# Patient Record
Sex: MALE | Race: WHITE | NOT HISPANIC OR LATINO | Employment: FULL TIME | URBAN - METROPOLITAN AREA
[De-identification: names, ages, dates, MRNs, and addresses within clinical notes are randomized per-mention and may not be internally consistent; named-entity substitution may affect disease eponyms.]

---

## 2023-03-31 PROBLEM — F98.8 ADD (ATTENTION DEFICIT DISORDER): Status: ACTIVE | Noted: 2023-03-31

## 2023-03-31 PROBLEM — A60.00 GENITAL HERPES: Status: ACTIVE | Noted: 2023-03-31

## 2023-03-31 RX ORDER — DEXTROAMPHETAMINE SACCHARATE, AMPHETAMINE ASPARTATE, DEXTROAMPHETAMINE SULFATE AND AMPHETAMINE SULFATE 3.75; 3.75; 3.75; 3.75 MG/1; MG/1; MG/1; MG/1
15 TABLET ORAL DAILY
COMMUNITY
End: 2023-04-18 | Stop reason: DRUGHIGH

## 2023-03-31 RX ORDER — VALACYCLOVIR HYDROCHLORIDE 1 G/1
1000 TABLET, FILM COATED ORAL 2 TIMES DAILY
COMMUNITY

## 2023-04-13 LAB
ALANINE AMINOTRANSFERASE (SGPT) (U/L) IN SER/PLAS: 45 U/L (ref 10–52)
ALBUMIN (G/DL) IN SER/PLAS: 4.6 G/DL (ref 3.4–5)
ALKALINE PHOSPHATASE (U/L) IN SER/PLAS: 48 U/L (ref 33–120)
ANION GAP IN SER/PLAS: 13 MMOL/L (ref 10–20)
ASPARTATE AMINOTRANSFERASE (SGOT) (U/L) IN SER/PLAS: 21 U/L (ref 9–39)
BILIRUBIN TOTAL (MG/DL) IN SER/PLAS: 0.7 MG/DL (ref 0–1.2)
CALCIUM (MG/DL) IN SER/PLAS: 9.4 MG/DL (ref 8.6–10.3)
CARBON DIOXIDE, TOTAL (MMOL/L) IN SER/PLAS: 30 MMOL/L (ref 21–32)
CHLORIDE (MMOL/L) IN SER/PLAS: 101 MMOL/L (ref 98–107)
CHOLESTEROL (MG/DL) IN SER/PLAS: 139 MG/DL (ref 0–199)
CHOLESTEROL IN HDL (MG/DL) IN SER/PLAS: 33 MG/DL
CHOLESTEROL/HDL RATIO: 4.2
CREATININE (MG/DL) IN SER/PLAS: 0.95 MG/DL (ref 0.5–1.3)
ERYTHROCYTE DISTRIBUTION WIDTH (RATIO) BY AUTOMATED COUNT: 11.9 % (ref 11.5–14.5)
ERYTHROCYTE MEAN CORPUSCULAR HEMOGLOBIN CONCENTRATION (G/DL) BY AUTOMATED: 33.7 G/DL (ref 32–36)
ERYTHROCYTE MEAN CORPUSCULAR VOLUME (FL) BY AUTOMATED COUNT: 91 FL (ref 80–100)
ERYTHROCYTES (10*6/UL) IN BLOOD BY AUTOMATED COUNT: 5 X10E12/L (ref 4.5–5.9)
GFR MALE: >90 ML/MIN/1.73M2
GLUCOSE (MG/DL) IN SER/PLAS: 91 MG/DL (ref 74–99)
HEMATOCRIT (%) IN BLOOD BY AUTOMATED COUNT: 45.7 % (ref 41–52)
HEMOGLOBIN (G/DL) IN BLOOD: 15.4 G/DL (ref 13.5–17.5)
LDL: 88 MG/DL (ref 0–99)
LEUKOCYTES (10*3/UL) IN BLOOD BY AUTOMATED COUNT: 4.8 X10E9/L (ref 4.4–11.3)
PLATELETS (10*3/UL) IN BLOOD AUTOMATED COUNT: 230 X10E9/L (ref 150–450)
POTASSIUM (MMOL/L) IN SER/PLAS: 3.7 MMOL/L (ref 3.5–5.3)
PROTEIN TOTAL: 6.9 G/DL (ref 6.4–8.2)
SODIUM (MMOL/L) IN SER/PLAS: 140 MMOL/L (ref 136–145)
THYROTROPIN (MIU/L) IN SER/PLAS BY DETECTION LIMIT <= 0.05 MIU/L: 1.55 MIU/L (ref 0.44–3.98)
TRIGLYCERIDE (MG/DL) IN SER/PLAS: 90 MG/DL (ref 0–149)
UREA NITROGEN (MG/DL) IN SER/PLAS: 14 MG/DL (ref 6–23)
VLDL: 18 MG/DL (ref 0–40)

## 2023-04-18 ENCOUNTER — OFFICE VISIT (OUTPATIENT)
Dept: PRIMARY CARE | Facility: CLINIC | Age: 33
End: 2023-04-18
Payer: COMMERCIAL

## 2023-04-18 VITALS
RESPIRATION RATE: 18 BRPM | HEIGHT: 67 IN | DIASTOLIC BLOOD PRESSURE: 76 MMHG | TEMPERATURE: 98 F | OXYGEN SATURATION: 97 % | WEIGHT: 198.6 LBS | SYSTOLIC BLOOD PRESSURE: 128 MMHG | BODY MASS INDEX: 31.17 KG/M2 | HEART RATE: 75 BPM

## 2023-04-18 DIAGNOSIS — F98.8 ATTENTION DEFICIT DISORDER (ADD) WITHOUT HYPERACTIVITY: Primary | ICD-10-CM

## 2023-04-18 DIAGNOSIS — F41.0 PANIC DISORDER: ICD-10-CM

## 2023-04-18 PROCEDURE — 99214 OFFICE O/P EST MOD 30 MIN: CPT | Performed by: FAMILY MEDICINE

## 2023-04-18 RX ORDER — DEXTROAMPHETAMINE SACCHARATE, AMPHETAMINE ASPARTATE, DEXTROAMPHETAMINE SULFATE AND AMPHETAMINE SULFATE 5; 5; 5; 5 MG/1; MG/1; MG/1; MG/1
20 TABLET ORAL DAILY
Qty: 30 TABLET | Refills: 0 | Status: SHIPPED | OUTPATIENT
Start: 2023-06-17 | End: 2023-07-25 | Stop reason: SDUPTHER

## 2023-04-18 RX ORDER — PROPRANOLOL HYDROCHLORIDE 10 MG/1
TABLET ORAL
Qty: 30 TABLET | Refills: 1 | Status: SHIPPED | OUTPATIENT
Start: 2023-04-18

## 2023-04-18 RX ORDER — DEXTROAMPHETAMINE SACCHARATE, AMPHETAMINE ASPARTATE, DEXTROAMPHETAMINE SULFATE AND AMPHETAMINE SULFATE 5; 5; 5; 5 MG/1; MG/1; MG/1; MG/1
20 TABLET ORAL DAILY
Qty: 30 TABLET | Refills: 0 | Status: SHIPPED | OUTPATIENT
Start: 2023-05-18 | End: 2023-07-25 | Stop reason: SDUPTHER

## 2023-04-18 RX ORDER — DEXTROAMPHETAMINE SACCHARATE, AMPHETAMINE ASPARTATE, DEXTROAMPHETAMINE SULFATE AND AMPHETAMINE SULFATE 5; 5; 5; 5 MG/1; MG/1; MG/1; MG/1
20 TABLET ORAL DAILY
Qty: 30 TABLET | Refills: 0 | Status: SHIPPED | OUTPATIENT
Start: 2023-04-18 | End: 2023-07-24 | Stop reason: SDUPTHER

## 2023-04-18 NOTE — PROGRESS NOTES
"Balta Dodson is a 33 y.o. male who presents for Follow-up (3 month medication recheck).    Adderall is working reasonably well.  Feels like it doesn't work as well as it did at first.    Doesn't sleep well, but that is unchanged.     Appetite is good.    Gets anxiety feelings when not on it.    Started golf, riding a peliton.  Meditating.             Review of Systems   Constitutional:  Negative for fever.   Respiratory:  Negative for shortness of breath.    Cardiovascular:  Negative for chest pain.   Gastrointestinal:  Negative for nausea and vomiting.   Neurological:  Negative for dizziness and light-headedness.   All other systems reviewed and are negative.      Objective   /76   Pulse 75   Temp 36.7 °C (98 °F)   Resp 18   Ht 1.702 m (5' 7\")   Wt 90.1 kg (198 lb 9.6 oz)   SpO2 97%   BMI 31.11 kg/m²     Physical Exam  Vitals reviewed.   Constitutional:       Appearance: Normal appearance.   HENT:      Head: Normocephalic and atraumatic.      Nose: Nose normal.   Eyes:      Extraocular Movements: Extraocular movements intact.      Pupils: Pupils are equal, round, and reactive to light.   Cardiovascular:      Rate and Rhythm: Normal rate and regular rhythm.   Pulmonary:      Effort: Pulmonary effort is normal.      Breath sounds: Normal breath sounds.   Musculoskeletal:         General: Normal range of motion.      Cervical back: Normal range of motion.   Skin:     General: Skin is warm and dry.   Neurological:      Mental Status: He is alert.   Psychiatric:         Mood and Affect: Mood normal.         Assessment/Plan   Problem List Items Addressed This Visit          Other    ADD (attention deficit disorder) - Primary     Adderall 15 mg helped quite a bit but will increase his dose to 20 mg to see if we can get a better effect.  .  He does not like to use the XR.  Had trouble with it in the past.   We discussed sleep hygiene, stress mgmt, etc.           Relevant Medications    " amphetamine-dextroamphetamine (Adderall) 20 mg tablet    amphetamine-dextroamphetamine (Adderall) 20 mg tablet (Start on 5/18/2023)    amphetamine-dextroamphetamine (Adderall) 20 mg tablet (Start on 6/17/2023)    Panic disorder     Stress mgmt.  Sxs are not due to the adderall as he seems to only have sxs when he is not on it.  Will do a trial of propranolol for prn use.   Behavioral modification.          Relevant Medications    propranolol (Inderal) 10 mg tablet         There are no Patient Instructions on file for this visit.

## 2023-04-20 ASSESSMENT — ENCOUNTER SYMPTOMS
VOMITING: 0
SHORTNESS OF BREATH: 0
LIGHT-HEADEDNESS: 0
DIZZINESS: 0
NAUSEA: 0
FEVER: 0

## 2023-04-20 NOTE — ASSESSMENT & PLAN NOTE
Adderall 15 mg helped quite a bit but will increase his dose to 20 mg to see if we can get a better effect.  .  He does not like to use the XR.  Had trouble with it in the past.   We discussed sleep hygiene, stress mgmt, etc.

## 2023-04-20 NOTE — ASSESSMENT & PLAN NOTE
Stress mgmt.  Sxs are not due to the adderall as he seems to only have sxs when he is not on it.  Will do a trial of propranolol for prn use.   Behavioral modification.

## 2023-07-24 ENCOUNTER — OFFICE VISIT (OUTPATIENT)
Dept: PRIMARY CARE | Facility: CLINIC | Age: 33
End: 2023-07-24
Payer: COMMERCIAL

## 2023-07-24 VITALS
HEART RATE: 74 BPM | HEIGHT: 67 IN | WEIGHT: 197 LBS | RESPIRATION RATE: 16 BRPM | DIASTOLIC BLOOD PRESSURE: 86 MMHG | BODY MASS INDEX: 30.92 KG/M2 | TEMPERATURE: 98.1 F | SYSTOLIC BLOOD PRESSURE: 124 MMHG

## 2023-07-24 DIAGNOSIS — F98.8 ATTENTION DEFICIT DISORDER (ADD) WITHOUT HYPERACTIVITY: ICD-10-CM

## 2023-07-24 PROCEDURE — 99213 OFFICE O/P EST LOW 20 MIN: CPT | Performed by: FAMILY MEDICINE

## 2023-07-24 RX ORDER — DEXTROAMPHETAMINE SACCHARATE, AMPHETAMINE ASPARTATE, DEXTROAMPHETAMINE SULFATE AND AMPHETAMINE SULFATE 5; 5; 5; 5 MG/1; MG/1; MG/1; MG/1
20 TABLET ORAL DAILY
Qty: 30 TABLET | Refills: 0 | Status: SHIPPED | OUTPATIENT
Start: 2023-07-24 | End: 2023-10-26 | Stop reason: SDUPTHER

## 2023-07-24 NOTE — PROGRESS NOTES
"Balta Dodson is a 33 y.o. male who presents for ADHD.    Weight loss since January is due to quitting pop and fast food.    Working out on his pelViS.     Feeling good on the adderall.  Sometimes doesn't take it on the weekend.      ADHD  This is a chronic problem. The current episode started more than 1 year ago. The problem has been resolved. Pertinent negatives include no chest pain, fever, nausea or vomiting.        Review of Systems   Constitutional:  Negative for fever.   Respiratory:  Negative for shortness of breath.    Cardiovascular:  Negative for chest pain.   Gastrointestinal:  Negative for nausea and vomiting.   Neurological:  Negative for dizziness and light-headedness.   All other systems reviewed and are negative.      Objective   /86   Pulse 74   Temp 36.7 °C (98.1 °F)   Resp 16   Ht 1.702 m (5' 7\")   Wt 89.4 kg (197 lb)   BMI 30.85 kg/m²     Physical Exam  Vitals reviewed.   Constitutional:       Appearance: Normal appearance.   HENT:      Head: Normocephalic and atraumatic.      Nose: Nose normal.   Eyes:      Extraocular Movements: Extraocular movements intact.      Pupils: Pupils are equal, round, and reactive to light.   Cardiovascular:      Rate and Rhythm: Normal rate and regular rhythm.   Pulmonary:      Effort: Pulmonary effort is normal.      Breath sounds: Normal breath sounds.   Musculoskeletal:         General: Normal range of motion.      Cervical back: Normal range of motion.   Skin:     General: Skin is warm and dry.   Neurological:      Mental Status: He is alert.   Psychiatric:         Mood and Affect: Mood normal.         Assessment/Plan   Problem List Items Addressed This Visit       ADD (attention deficit disorder)    Relevant Medications    amphetamine-dextroamphetamine (Adderall) 20 mg tablet    amphetamine-dextroamphetamine (Adderall) 20 mg tablet (Start on 8/23/2023)    amphetamine-dextroamphetamine (Adderall) 20 mg tablet (Start on 9/23/2023) "         There are no Patient Instructions on file for this visit.   Yes

## 2023-07-25 RX ORDER — DEXTROAMPHETAMINE SACCHARATE, AMPHETAMINE ASPARTATE, DEXTROAMPHETAMINE SULFATE AND AMPHETAMINE SULFATE 5; 5; 5; 5 MG/1; MG/1; MG/1; MG/1
20 TABLET ORAL DAILY
Qty: 30 TABLET | Refills: 0 | Status: SHIPPED | OUTPATIENT
Start: 2023-08-23 | End: 2023-10-26 | Stop reason: SDUPTHER

## 2023-07-25 RX ORDER — DEXTROAMPHETAMINE SACCHARATE, AMPHETAMINE ASPARTATE, DEXTROAMPHETAMINE SULFATE AND AMPHETAMINE SULFATE 5; 5; 5; 5 MG/1; MG/1; MG/1; MG/1
20 TABLET ORAL DAILY
Qty: 30 TABLET | Refills: 0 | Status: SHIPPED | OUTPATIENT
Start: 2023-09-23 | End: 2023-12-01 | Stop reason: DRUGHIGH

## 2023-07-25 ASSESSMENT — ENCOUNTER SYMPTOMS
SHORTNESS OF BREATH: 0
NAUSEA: 0
DIZZINESS: 0
FEVER: 0
LIGHT-HEADEDNESS: 0
VOMITING: 0

## 2023-10-25 ENCOUNTER — TELEPHONE (OUTPATIENT)
Dept: PRIMARY CARE | Facility: CLINIC | Age: 33
End: 2023-10-25
Payer: COMMERCIAL

## 2023-10-25 NOTE — TELEPHONE ENCOUNTER
Pt's follow up appointment was rescheduled by us twice and is now set for 12/01/2023. Pt needs refill of Adderall 20mg to hold him over. Pt has enough med for 10/25/23 but will be out after that. Local pharm correct.

## 2023-10-26 DIAGNOSIS — F98.8 ATTENTION DEFICIT DISORDER (ADD) WITHOUT HYPERACTIVITY: ICD-10-CM

## 2023-10-26 RX ORDER — DEXTROAMPHETAMINE SACCHARATE, AMPHETAMINE ASPARTATE, DEXTROAMPHETAMINE SULFATE AND AMPHETAMINE SULFATE 5; 5; 5; 5 MG/1; MG/1; MG/1; MG/1
20 TABLET ORAL DAILY
Qty: 30 TABLET | Refills: 0 | Status: SHIPPED | OUTPATIENT
Start: 2023-10-26 | End: 2023-12-01 | Stop reason: DRUGHIGH

## 2023-10-26 RX ORDER — DEXTROAMPHETAMINE SACCHARATE, AMPHETAMINE ASPARTATE, DEXTROAMPHETAMINE SULFATE AND AMPHETAMINE SULFATE 5; 5; 5; 5 MG/1; MG/1; MG/1; MG/1
20 TABLET ORAL DAILY
Qty: 30 TABLET | Refills: 0 | Status: SHIPPED | OUTPATIENT
Start: 2023-11-25 | End: 2023-12-01 | Stop reason: DRUGHIGH

## 2023-11-24 ENCOUNTER — APPOINTMENT (OUTPATIENT)
Dept: PRIMARY CARE | Facility: CLINIC | Age: 33
End: 2023-11-24
Payer: COMMERCIAL

## 2023-12-01 ENCOUNTER — OFFICE VISIT (OUTPATIENT)
Dept: PRIMARY CARE | Facility: CLINIC | Age: 33
End: 2023-12-01
Payer: COMMERCIAL

## 2023-12-01 VITALS
SYSTOLIC BLOOD PRESSURE: 128 MMHG | TEMPERATURE: 98 F | HEART RATE: 84 BPM | WEIGHT: 202.13 LBS | DIASTOLIC BLOOD PRESSURE: 84 MMHG | BODY MASS INDEX: 31.72 KG/M2 | HEIGHT: 67 IN

## 2023-12-01 DIAGNOSIS — F41.9 ANXIETY: ICD-10-CM

## 2023-12-01 DIAGNOSIS — R52 PAIN: ICD-10-CM

## 2023-12-01 DIAGNOSIS — F98.8 ATTENTION DEFICIT DISORDER (ADD) WITHOUT HYPERACTIVITY: ICD-10-CM

## 2023-12-01 DIAGNOSIS — F90.2 ATTENTION DEFICIT HYPERACTIVITY DISORDER (ADHD), COMBINED TYPE: ICD-10-CM

## 2023-12-01 LAB
AMPHETAMINES UR QL SCN: ABNORMAL
BARBITURATES UR QL SCN: ABNORMAL
BENZODIAZ UR QL SCN: ABNORMAL
BZE UR QL SCN: ABNORMAL
CANNABINOIDS UR QL SCN: ABNORMAL
FENTANYL+NORFENTANYL UR QL SCN: ABNORMAL
OPIATES UR QL SCN: ABNORMAL
OXYCODONE+OXYMORPHONE UR QL SCN: ABNORMAL
PCP UR QL SCN: ABNORMAL

## 2023-12-01 PROCEDURE — 80324 DRUG SCREEN AMPHETAMINES 1/2: CPT

## 2023-12-01 PROCEDURE — 80307 DRUG TEST PRSMV CHEM ANLYZR: CPT

## 2023-12-01 PROCEDURE — 99213 OFFICE O/P EST LOW 20 MIN: CPT | Performed by: FAMILY MEDICINE

## 2023-12-01 RX ORDER — DEXTROAMPHETAMINE SACCHARATE, AMPHETAMINE ASPARTATE, DEXTROAMPHETAMINE SULFATE AND AMPHETAMINE SULFATE 7.5; 7.5; 7.5; 7.5 MG/1; MG/1; MG/1; MG/1
30 TABLET ORAL DAILY
Qty: 30 TABLET | Refills: 0 | Status: SHIPPED | OUTPATIENT
Start: 2023-12-31 | End: 2024-02-06 | Stop reason: SDUPTHER

## 2023-12-01 RX ORDER — DEXTROAMPHETAMINE SACCHARATE, AMPHETAMINE ASPARTATE, DEXTROAMPHETAMINE SULFATE AND AMPHETAMINE SULFATE 7.5; 7.5; 7.5; 7.5 MG/1; MG/1; MG/1; MG/1
30 TABLET ORAL DAILY
Qty: 30 TABLET | Refills: 0 | Status: SHIPPED | OUTPATIENT
Start: 2023-12-01 | End: 2024-03-01 | Stop reason: SDUPTHER

## 2023-12-01 RX ORDER — DEXTROAMPHETAMINE SACCHARATE, AMPHETAMINE ASPARTATE, DEXTROAMPHETAMINE SULFATE AND AMPHETAMINE SULFATE 7.5; 7.5; 7.5; 7.5 MG/1; MG/1; MG/1; MG/1
30 TABLET ORAL DAILY
Qty: 30 TABLET | Refills: 0 | Status: SHIPPED | OUTPATIENT
Start: 2024-01-30 | End: 2024-03-01 | Stop reason: SDUPTHER

## 2023-12-01 NOTE — PROGRESS NOTES
"Balta Dodson is a 33 y.o. male who presents for ADD (Fu/UDS and CSA needed).    Doing ok but still having trouble concentrating.  Uses the med early in the day.  Wears off by 2 pm.  Has been using 25 mg daily for an additional affect due to appointment being cancelled by us.  Would like to have a higher dose.           Review of Systems   Constitutional:  Negative for fever.   Respiratory:  Negative for shortness of breath.    Cardiovascular:  Negative for chest pain.   Gastrointestinal:  Negative for nausea and vomiting.   Neurological:  Negative for dizziness and light-headedness.   All other systems reviewed and are negative.      Objective   /89   Pulse 84   Temp 36.7 °C (98 °F)   Ht 1.702 m (5' 7\")   Wt 91.7 kg (202 lb 2 oz)   BMI 31.66 kg/m²     Physical Exam  Vitals reviewed.   Constitutional:       Appearance: Normal appearance.   HENT:      Head: Normocephalic and atraumatic.      Nose: Nose normal.   Eyes:      Extraocular Movements: Extraocular movements intact.      Pupils: Pupils are equal, round, and reactive to light.   Cardiovascular:      Rate and Rhythm: Normal rate and regular rhythm.   Pulmonary:      Effort: Pulmonary effort is normal.      Breath sounds: Normal breath sounds.   Musculoskeletal:         General: Normal range of motion.      Cervical back: Normal range of motion.   Skin:     General: Skin is warm and dry.   Neurological:      Mental Status: He is alert.   Psychiatric:         Mood and Affect: Mood normal.         Assessment/Plan   Problem List Items Addressed This Visit       ADD (attention deficit disorder)    Relevant Medications    amphetamine-dextroamphetamine (Adderall) 30 mg tablet    amphetamine-dextroamphetamine (Adderall) 30 mg tablet (Start on 12/31/2023)    amphetamine-dextroamphetamine (Adderall) 30 mg tablet (Start on 1/30/2024)    Other Relevant Orders    Follow Up In [facility] - PCP - Established    Drug Screen, Urine With " Reflex to Confirmation     Other Visit Diagnoses       Pain        Relevant Orders    Drug Screen, Urine With Reflex to Confirmation    Anxiety        Relevant Orders    Drug Screen, Urine With Reflex to Confirmation              There are no Patient Instructions on file for this visit.

## 2023-12-04 ASSESSMENT — ENCOUNTER SYMPTOMS
FEVER: 0
DIZZINESS: 0
VOMITING: 0
SHORTNESS OF BREATH: 0
NAUSEA: 0
LIGHT-HEADEDNESS: 0

## 2023-12-05 LAB
AMPHET UR-MCNC: 1172 NG/ML
MDA UR-MCNC: <200 NG/ML
MDEA UR-MCNC: <200 NG/ML
MDMA UR-MCNC: <200 NG/ML
METHAMPHET UR-MCNC: <200 NG/ML
PHENTERMINE UR CFM-MCNC: <200 NG/ML

## 2024-02-06 ENCOUNTER — TELEPHONE (OUTPATIENT)
Dept: PRIMARY CARE | Facility: CLINIC | Age: 34
End: 2024-02-06
Payer: COMMERCIAL

## 2024-02-06 DIAGNOSIS — F98.8 ATTENTION DEFICIT DISORDER (ADD) WITHOUT HYPERACTIVITY: ICD-10-CM

## 2024-02-06 RX ORDER — DEXTROAMPHETAMINE SACCHARATE, AMPHETAMINE ASPARTATE, DEXTROAMPHETAMINE SULFATE AND AMPHETAMINE SULFATE 7.5; 7.5; 7.5; 7.5 MG/1; MG/1; MG/1; MG/1
30 TABLET ORAL DAILY
Qty: 30 TABLET | Refills: 0 | Status: SHIPPED | OUTPATIENT
Start: 2024-02-06 | End: 2024-03-01 | Stop reason: SDUPTHER

## 2024-02-06 NOTE — TELEPHONE ENCOUNTER
Pt called and said that the [facility] he was using closed and he needs his adderall called into a new pharmacy.     [facility] [id number]

## 2024-03-01 ENCOUNTER — OFFICE VISIT (OUTPATIENT)
Dept: PRIMARY CARE | Facility: CLINIC | Age: 34
End: 2024-03-01
Payer: COMMERCIAL

## 2024-03-01 VITALS
BODY MASS INDEX: 31.23 KG/M2 | SYSTOLIC BLOOD PRESSURE: 126 MMHG | TEMPERATURE: 98.1 F | HEIGHT: 67 IN | WEIGHT: 199 LBS | DIASTOLIC BLOOD PRESSURE: 87 MMHG | HEART RATE: 70 BPM

## 2024-03-01 DIAGNOSIS — F98.8 ATTENTION DEFICIT DISORDER (ADD) WITHOUT HYPERACTIVITY: ICD-10-CM

## 2024-03-01 PROCEDURE — 99213 OFFICE O/P EST LOW 20 MIN: CPT | Performed by: FAMILY MEDICINE

## 2024-03-01 RX ORDER — DEXTROAMPHETAMINE SACCHARATE, AMPHETAMINE ASPARTATE, DEXTROAMPHETAMINE SULFATE AND AMPHETAMINE SULFATE 7.5; 7.5; 7.5; 7.5 MG/1; MG/1; MG/1; MG/1
30 TABLET ORAL DAILY
Qty: 30 TABLET | Refills: 0 | Status: SHIPPED | OUTPATIENT
Start: 2024-04-30 | End: 2024-06-04 | Stop reason: SDUPTHER

## 2024-03-01 RX ORDER — DEXTROAMPHETAMINE SACCHARATE, AMPHETAMINE ASPARTATE, DEXTROAMPHETAMINE SULFATE AND AMPHETAMINE SULFATE 7.5; 7.5; 7.5; 7.5 MG/1; MG/1; MG/1; MG/1
30 TABLET ORAL DAILY
Qty: 30 TABLET | Refills: 0 | Status: SHIPPED | OUTPATIENT
Start: 2024-03-01 | End: 2024-06-04 | Stop reason: SDUPTHER

## 2024-03-01 RX ORDER — DEXTROAMPHETAMINE SACCHARATE, AMPHETAMINE ASPARTATE, DEXTROAMPHETAMINE SULFATE AND AMPHETAMINE SULFATE 7.5; 7.5; 7.5; 7.5 MG/1; MG/1; MG/1; MG/1
30 TABLET ORAL DAILY
Qty: 30 TABLET | Refills: 0 | Status: SHIPPED | OUTPATIENT
Start: 2024-03-31 | End: 2024-06-04 | Stop reason: SDUPTHER

## 2024-03-01 NOTE — PROGRESS NOTES
"Balta Dodson is a 34 y.o. male who presents for ADD (Routine med follow up).    Having a baby in August.    Just bought a house in [place].    Doing well on the current adderall.  Doesn't sleep well but never has.      ADD  Pertinent negatives include no chest pain, fever, nausea or vomiting.        Review of Systems   Constitutional:  Negative for fever.   Respiratory:  Negative for shortness of breath.    Cardiovascular:  Negative for chest pain.   Gastrointestinal:  Negative for nausea and vomiting.   Neurological:  Negative for dizziness and light-headedness.   All other systems reviewed and are negative.      Objective   /87   Pulse 70   Temp 36.7 °C (98.1 °F)   Ht 1.702 m (5' 7\")   Wt 90.3 kg (199 lb)   BMI 31.17 kg/m²     Physical Exam  Vitals reviewed.   Constitutional:       Appearance: Normal appearance.   HENT:      Head: Normocephalic and atraumatic.      Nose: Nose normal.   Eyes:      Extraocular Movements: Extraocular movements intact.      Pupils: Pupils are equal, round, and reactive to light.   Cardiovascular:      Rate and Rhythm: Normal rate and regular rhythm.   Pulmonary:      Effort: Pulmonary effort is normal.      Breath sounds: Normal breath sounds.   Musculoskeletal:         General: Normal range of motion.      Cervical back: Normal range of motion.   Skin:     General: Skin is warm and dry.   Neurological:      Mental Status: He is alert.   Psychiatric:         Mood and Affect: Mood normal.         Assessment/Plan   Problem List Items Addressed This Visit       ADD (attention deficit disorder)     Doing well on current medication.  Blood pressure stable.  Continue same         Relevant Medications    amphetamine-dextroamphetamine (Adderall) 30 mg tablet (Start on 3/31/2024)    amphetamine-dextroamphetamine (Adderall) 30 mg tablet (Start on 4/30/2024)    amphetamine-dextroamphetamine (Adderall) 30 mg tablet    Other Relevant Orders    3 Months - Follow Up " In [facility] - PCP         There are no Patient Instructions on file for this visit.

## 2024-03-04 ASSESSMENT — ENCOUNTER SYMPTOMS
SHORTNESS OF BREATH: 0
LIGHT-HEADEDNESS: 0
DIZZINESS: 0
NAUSEA: 0
FEVER: 0
VOMITING: 0

## 2024-04-01 ENCOUNTER — APPOINTMENT (OUTPATIENT)
Dept: ENDOCRINOLOGY | Facility: CLINIC | Age: 34
End: 2024-04-01
Payer: COMMERCIAL

## 2024-06-04 ENCOUNTER — OFFICE VISIT (OUTPATIENT)
Dept: PRIMARY CARE | Facility: CLINIC | Age: 34
End: 2024-06-04
Payer: COMMERCIAL

## 2024-06-04 VITALS
HEIGHT: 67 IN | WEIGHT: 199 LBS | DIASTOLIC BLOOD PRESSURE: 80 MMHG | RESPIRATION RATE: 16 BRPM | BODY MASS INDEX: 31.23 KG/M2 | TEMPERATURE: 97.7 F | HEART RATE: 80 BPM | SYSTOLIC BLOOD PRESSURE: 122 MMHG

## 2024-06-04 DIAGNOSIS — F98.8 ATTENTION DEFICIT DISORDER (ADD) WITHOUT HYPERACTIVITY: ICD-10-CM

## 2024-06-04 PROCEDURE — 99213 OFFICE O/P EST LOW 20 MIN: CPT | Performed by: FAMILY MEDICINE

## 2024-06-04 RX ORDER — DEXTROAMPHETAMINE SACCHARATE, AMPHETAMINE ASPARTATE, DEXTROAMPHETAMINE SULFATE AND AMPHETAMINE SULFATE 7.5; 7.5; 7.5; 7.5 MG/1; MG/1; MG/1; MG/1
30 TABLET ORAL DAILY
Qty: 30 TABLET | Refills: 0 | Status: SHIPPED | OUTPATIENT
Start: 2024-07-04 | End: 2024-08-03

## 2024-06-04 RX ORDER — DEXTROAMPHETAMINE SACCHARATE, AMPHETAMINE ASPARTATE, DEXTROAMPHETAMINE SULFATE AND AMPHETAMINE SULFATE 7.5; 7.5; 7.5; 7.5 MG/1; MG/1; MG/1; MG/1
30 TABLET ORAL DAILY
Qty: 30 TABLET | Refills: 0 | Status: SHIPPED | OUTPATIENT
Start: 2024-08-02 | End: 2024-09-01

## 2024-06-04 RX ORDER — DEXTROAMPHETAMINE SACCHARATE, AMPHETAMINE ASPARTATE, DEXTROAMPHETAMINE SULFATE AND AMPHETAMINE SULFATE 7.5; 7.5; 7.5; 7.5 MG/1; MG/1; MG/1; MG/1
30 TABLET ORAL DAILY
Qty: 30 TABLET | Refills: 0 | Status: SHIPPED | OUTPATIENT
Start: 2024-06-04 | End: 2024-07-04

## 2024-06-04 NOTE — PROGRESS NOTES
"Subjective   Patient ID: Sarah Dodson is a 34 y.o. male who presents for ADHD (3 month check ).    Having a baby in August.  Just bought a new house.  Getting settled in.    Doing well on meds.  No side effects.  Symptoms are well controlled.           Review of Systems    Objective   /80   Pulse 80   Temp 36.5 °C (97.7 °F)   Resp 16   Ht 1.702 m (5' 7\")   Wt 90.3 kg (199 lb)   BMI 31.17 kg/m²     Physical Exam  Vitals reviewed.   Constitutional:       Appearance: Normal appearance.   HENT:      Head: Normocephalic and atraumatic.      Nose: Nose normal.   Eyes:      Extraocular Movements: Extraocular movements intact.      Pupils: Pupils are equal, round, and reactive to light.   Cardiovascular:      Rate and Rhythm: Normal rate and regular rhythm.   Pulmonary:      Effort: Pulmonary effort is normal.      Breath sounds: Normal breath sounds.   Musculoskeletal:         General: Normal range of motion.      Cervical back: Normal range of motion.   Skin:     General: Skin is warm and dry.   Neurological:      Mental Status: He is alert.   Psychiatric:         Mood and Affect: Mood normal.         Assessment/Plan   Problem List Items Addressed This Visit             ICD-10-CM    ADD (attention deficit disorder) F98.8    Relevant Medications    amphetamine-dextroamphetamine (Adderall) 30 mg tablet (Start on 8/2/2024)    amphetamine-dextroamphetamine (Adderall) 30 mg tablet (Start on 7/4/2024)    amphetamine-dextroamphetamine (Adderall) 30 mg tablet    Other Relevant Orders    Follow Up In [facility] - PCP - Established          "

## 2024-09-03 DIAGNOSIS — F98.8 ATTENTION DEFICIT DISORDER (ADD) WITHOUT HYPERACTIVITY: ICD-10-CM

## 2024-09-03 RX ORDER — DEXTROAMPHETAMINE SACCHARATE, AMPHETAMINE ASPARTATE, DEXTROAMPHETAMINE SULFATE AND AMPHETAMINE SULFATE 7.5; 7.5; 7.5; 7.5 MG/1; MG/1; MG/1; MG/1
30 TABLET ORAL DAILY
Qty: 30 TABLET | Refills: 0 | Status: SHIPPED | OUTPATIENT
Start: 2024-10-03 | End: 2024-11-02

## 2024-09-03 RX ORDER — DEXTROAMPHETAMINE SACCHARATE, AMPHETAMINE ASPARTATE, DEXTROAMPHETAMINE SULFATE AND AMPHETAMINE SULFATE 7.5; 7.5; 7.5; 7.5 MG/1; MG/1; MG/1; MG/1
30 TABLET ORAL DAILY
Qty: 30 TABLET | Refills: 0 | Status: SHIPPED | OUTPATIENT
Start: 2024-11-02 | End: 2024-12-02

## 2024-09-03 RX ORDER — DEXTROAMPHETAMINE SACCHARATE, AMPHETAMINE ASPARTATE, DEXTROAMPHETAMINE SULFATE AND AMPHETAMINE SULFATE 7.5; 7.5; 7.5; 7.5 MG/1; MG/1; MG/1; MG/1
30 TABLET ORAL DAILY
Qty: 30 TABLET | Refills: 0 | Status: SHIPPED | OUTPATIENT
Start: 2024-09-03 | End: 2024-10-03

## 2024-12-06 ENCOUNTER — APPOINTMENT (OUTPATIENT)
Dept: PRIMARY CARE | Facility: CLINIC | Age: 34
End: 2024-12-06
Payer: COMMERCIAL

## 2024-12-06 VITALS
HEART RATE: 105 BPM | HEIGHT: 67 IN | TEMPERATURE: 98.2 F | BODY MASS INDEX: 31.23 KG/M2 | SYSTOLIC BLOOD PRESSURE: 120 MMHG | WEIGHT: 199 LBS | DIASTOLIC BLOOD PRESSURE: 70 MMHG

## 2024-12-06 DIAGNOSIS — F98.8 ATTENTION DEFICIT DISORDER (ADD) WITHOUT HYPERACTIVITY: ICD-10-CM

## 2024-12-06 PROCEDURE — 3008F BODY MASS INDEX DOCD: CPT | Performed by: FAMILY MEDICINE

## 2024-12-06 PROCEDURE — 99213 OFFICE O/P EST LOW 20 MIN: CPT | Performed by: FAMILY MEDICINE

## 2024-12-06 RX ORDER — DEXTROAMPHETAMINE SACCHARATE, AMPHETAMINE ASPARTATE, DEXTROAMPHETAMINE SULFATE AND AMPHETAMINE SULFATE 7.5; 7.5; 7.5; 7.5 MG/1; MG/1; MG/1; MG/1
30 TABLET ORAL DAILY
Qty: 30 TABLET | Refills: 0 | Status: SHIPPED | OUTPATIENT
Start: 2024-12-06 | End: 2025-01-05

## 2024-12-06 RX ORDER — DEXTROAMPHETAMINE SACCHARATE, AMPHETAMINE ASPARTATE, DEXTROAMPHETAMINE SULFATE AND AMPHETAMINE SULFATE 7.5; 7.5; 7.5; 7.5 MG/1; MG/1; MG/1; MG/1
30 TABLET ORAL DAILY
Qty: 30 TABLET | Refills: 0 | Status: SHIPPED | OUTPATIENT
Start: 2025-02-06 | End: 2025-03-08

## 2024-12-06 RX ORDER — DEXTROAMPHETAMINE SACCHARATE, AMPHETAMINE ASPARTATE, DEXTROAMPHETAMINE SULFATE AND AMPHETAMINE SULFATE 7.5; 7.5; 7.5; 7.5 MG/1; MG/1; MG/1; MG/1
30 TABLET ORAL DAILY
Qty: 30 TABLET | Refills: 0 | Status: SHIPPED | OUTPATIENT
Start: 2025-01-05 | End: 2025-02-04

## 2024-12-06 RX ORDER — VALACYCLOVIR HYDROCHLORIDE 1 G/1
1000 TABLET, FILM COATED ORAL DAILY
Qty: 90 TABLET | Refills: 0 | Status: SHIPPED | OUTPATIENT
Start: 2024-12-06

## 2024-12-06 NOTE — PROGRESS NOTES
"Balta Dodson is a 34 y.o. male who presents for ADD (6month check/Due for his CSA and UDS/).    Doing well on his current dose of Adderall.  Has a new baby so is not sleeping as much as he normally would be.  Overall feels good does not have any complaints.  Blood pressure stable weight stable         Review of Systems    Objective   /70   Pulse 105   Temp 36.8 °C (98.2 °F)   Ht 1.702 m (5' 7\")   Wt 90.3 kg (199 lb)   BMI 31.17 kg/m²     Physical Exam  Vitals reviewed.   Constitutional:       Appearance: Normal appearance.   HENT:      Head: Normocephalic and atraumatic.      Nose: Nose normal.   Eyes:      Extraocular Movements: Extraocular movements intact.      Pupils: Pupils are equal, round, and reactive to light.   Cardiovascular:      Rate and Rhythm: Normal rate and regular rhythm.   Pulmonary:      Effort: Pulmonary effort is normal.      Breath sounds: Normal breath sounds.   Musculoskeletal:         General: Normal range of motion.      Cervical back: Normal range of motion.   Skin:     General: Skin is warm and dry.   Neurological:      Mental Status: He is alert.   Psychiatric:         Mood and Affect: Mood normal.         Assessment/Plan   Assessment & Plan  Attention deficit disorder (ADD) without hyperactivity  Continue behavioral modification return in 6 months  Orders:    Follow Up In [facility] - PCP - Established    amphetamine-dextroamphetamine (Adderall) 30 mg tablet; Take 1 tablet (30 mg) by mouth once daily.    amphetamine-dextroamphetamine (Adderall) 30 mg tablet; Take 1 tablet (30 mg) by mouth once daily. Do not fill before January 5, 2025.    amphetamine-dextroamphetamine (Adderall) 30 mg tablet; Take 1 tablet (30 mg) by mouth once daily. Do not fill before February 6, 2025.    Follow Up In [facility] - PCP - Health Maintenance; Future           There are no Patient Instructions on file for this visit.  "

## 2025-03-04 DIAGNOSIS — F98.8 ATTENTION DEFICIT DISORDER (ADD) WITHOUT HYPERACTIVITY: ICD-10-CM

## 2025-03-04 RX ORDER — DEXTROAMPHETAMINE SACCHARATE, AMPHETAMINE ASPARTATE, DEXTROAMPHETAMINE SULFATE AND AMPHETAMINE SULFATE 7.5; 7.5; 7.5; 7.5 MG/1; MG/1; MG/1; MG/1
30 TABLET ORAL DAILY
Qty: 30 TABLET | Refills: 0 | Status: SHIPPED | OUTPATIENT
Start: 2025-04-02 | End: 2025-05-02

## 2025-03-04 RX ORDER — DEXTROAMPHETAMINE SACCHARATE, AMPHETAMINE ASPARTATE, DEXTROAMPHETAMINE SULFATE AND AMPHETAMINE SULFATE 7.5; 7.5; 7.5; 7.5 MG/1; MG/1; MG/1; MG/1
30 TABLET ORAL DAILY
Qty: 30 TABLET | Refills: 0 | Status: SHIPPED | OUTPATIENT
Start: 2025-03-04 | End: 2025-04-03

## 2025-03-04 RX ORDER — DEXTROAMPHETAMINE SACCHARATE, AMPHETAMINE ASPARTATE, DEXTROAMPHETAMINE SULFATE AND AMPHETAMINE SULFATE 7.5; 7.5; 7.5; 7.5 MG/1; MG/1; MG/1; MG/1
30 TABLET ORAL DAILY
Qty: 30 TABLET | Refills: 0 | Status: SHIPPED | OUTPATIENT
Start: 2025-05-02 | End: 2025-06-01

## 2025-06-13 ENCOUNTER — APPOINTMENT (OUTPATIENT)
Dept: PRIMARY CARE | Facility: CLINIC | Age: 35
End: 2025-06-13
Payer: COMMERCIAL

## 2025-06-13 VITALS
BODY MASS INDEX: 30.96 KG/M2 | WEIGHT: 197.25 LBS | DIASTOLIC BLOOD PRESSURE: 72 MMHG | HEART RATE: 82 BPM | SYSTOLIC BLOOD PRESSURE: 136 MMHG | HEIGHT: 67 IN | TEMPERATURE: 97.6 F

## 2025-06-13 DIAGNOSIS — F98.8 ATTENTION DEFICIT DISORDER (ADD) WITHOUT HYPERACTIVITY: ICD-10-CM

## 2025-06-13 DIAGNOSIS — E55.9 VITAMIN D DEFICIENCY: ICD-10-CM

## 2025-06-13 DIAGNOSIS — F90.2 ATTENTION DEFICIT HYPERACTIVITY DISORDER (ADHD), COMBINED TYPE: Primary | ICD-10-CM

## 2025-06-13 DIAGNOSIS — Z13.220 SCREENING FOR CHOLESTEROL LEVEL: ICD-10-CM

## 2025-06-13 DIAGNOSIS — F51.01 PRIMARY INSOMNIA: ICD-10-CM

## 2025-06-13 PROCEDURE — 3008F BODY MASS INDEX DOCD: CPT | Performed by: FAMILY MEDICINE

## 2025-06-13 PROCEDURE — 99213 OFFICE O/P EST LOW 20 MIN: CPT | Performed by: FAMILY MEDICINE

## 2025-06-13 RX ORDER — DEXTROAMPHETAMINE SACCHARATE, AMPHETAMINE ASPARTATE, DEXTROAMPHETAMINE SULFATE AND AMPHETAMINE SULFATE 7.5; 7.5; 7.5; 7.5 MG/1; MG/1; MG/1; MG/1
30 TABLET ORAL DAILY
Qty: 30 TABLET | Refills: 0 | Status: SHIPPED | OUTPATIENT
Start: 2025-07-13 | End: 2025-08-12

## 2025-06-13 RX ORDER — DEXTROAMPHETAMINE SACCHARATE, AMPHETAMINE ASPARTATE, DEXTROAMPHETAMINE SULFATE AND AMPHETAMINE SULFATE 7.5; 7.5; 7.5; 7.5 MG/1; MG/1; MG/1; MG/1
30 TABLET ORAL DAILY
Qty: 30 TABLET | Refills: 0 | Status: SHIPPED | OUTPATIENT
Start: 2025-06-13 | End: 2025-07-13

## 2025-06-13 RX ORDER — DEXTROAMPHETAMINE SACCHARATE, AMPHETAMINE ASPARTATE, DEXTROAMPHETAMINE SULFATE AND AMPHETAMINE SULFATE 7.5; 7.5; 7.5; 7.5 MG/1; MG/1; MG/1; MG/1
30 TABLET ORAL DAILY
Qty: 30 TABLET | Refills: 0 | Status: SHIPPED | OUTPATIENT
Start: 2025-08-12 | End: 2025-09-11

## 2025-06-13 RX ORDER — NORTRIPTYLINE HYDROCHLORIDE 10 MG/1
10 CAPSULE ORAL NIGHTLY
Qty: 30 CAPSULE | Refills: 5 | Status: SHIPPED | OUTPATIENT
Start: 2025-06-13 | End: 2025-12-10

## 2025-06-13 NOTE — ASSESSMENT & PLAN NOTE
Orders:    nortriptyline (Pamelor) 10 mg capsule; Take 1 capsule (10 mg) by mouth once daily at bedtime.    Comprehensive Metabolic Panel; Future     normal appearance, no deformities, trachea midline, no thyroid nodules, no masses, no JVD

## 2025-06-13 NOTE — PROGRESS NOTES
"Balta Dodson is a 35 y.o. male who presents for ADD (Routine check for meds).    Doing great on the adderall. No side effects.  No palpitations.      Has never slept well.  Had tried melatonin and magnesium without effect.          Review of Systems    Objective   /72   Pulse 82   Temp 36.4 °C (97.6 °F) (Temporal)   Ht 1.702 m (5' 7\")   Wt 89.5 kg (197 lb 4 oz)   BMI 30.89 kg/m²     Physical Exam  Vitals reviewed.   Constitutional:       Appearance: Normal appearance.   HENT:      Head: Normocephalic and atraumatic.      Nose: Nose normal.   Eyes:      Extraocular Movements: Extraocular movements intact.      Pupils: Pupils are equal, round, and reactive to light.   Cardiovascular:      Rate and Rhythm: Normal rate and regular rhythm.   Pulmonary:      Effort: Pulmonary effort is normal.      Breath sounds: Normal breath sounds.   Musculoskeletal:         General: Normal range of motion.      Cervical back: Normal range of motion.   Skin:     General: Skin is warm and dry.   Neurological:      Mental Status: He is alert.   Psychiatric:         Mood and Affect: Mood normal.         Assessment/Plan   Assessment & Plan  Attention deficit disorder (ADD) without hyperactivity    Orders:    Follow Up In [facility] - PCP - Health Maintenance    amphetamine-dextroamphetamine (Adderall) 30 mg tablet; Take 1 tablet (30 mg) by mouth once daily. Do not fill before August 12, 2025.    amphetamine-dextroamphetamine (Adderall) 30 mg tablet; Take 1 tablet (30 mg) by mouth once daily. Do not fill before July 13, 2025.    amphetamine-dextroamphetamine (Adderall) 30 mg tablet; Take 1 tablet (30 mg) by mouth once daily.    Attention deficit hyperactivity disorder (ADHD), combined type    Orders:    Follow Up In [facility] - PCP - Health Maintenance; Future    Primary insomnia    Orders:    nortriptyline (Pamelor) 10 mg capsule; Take 1 capsule (10 mg) by mouth once daily at bedtime.    " Comprehensive Metabolic Panel; Future    Screening for cholesterol level    Orders:    Lipid Panel; Future    Vitamin D deficiency    Orders:    Vitamin D 25-Hydroxy,Total (for eval of Vitamin D levels); Future           There are no Patient Instructions on file for this visit.

## 2025-12-22 ENCOUNTER — APPOINTMENT (OUTPATIENT)
Dept: PRIMARY CARE | Facility: CLINIC | Age: 35
End: 2025-12-22
Payer: COMMERCIAL